# Patient Record
Sex: FEMALE | Race: OTHER
[De-identification: names, ages, dates, MRNs, and addresses within clinical notes are randomized per-mention and may not be internally consistent; named-entity substitution may affect disease eponyms.]

---

## 2018-06-07 LAB
ALBUMIN SERPL BCP-MCNC: 4.2 G/DL (ref 3.4–5)
ALBUMIN/GLOB SERPL: 1.4 {RATIO} (ref 1.1–1.5)
ALP SERPL-CCNC: 128 IU/L (ref 46–116)
ALT SERPL W P-5'-P-CCNC: 23 U/L (ref 12–78)
ANION GAP SERPL CALCULATED.3IONS-SCNC: 10 MMOL/L (ref 8–16)
AST SERPL W P-5'-P-CCNC: 13 U/L (ref 10–37)
BASOPHILS # BLD AUTO: 0 X10'3 (ref 0–0.2)
BASOPHILS NFR BLD AUTO: 0.3 % (ref 0–1)
BILIRUB SERPL-MCNC: 0.5 MG/DL (ref 0.1–1)
BUN SERPL-MCNC: 8 MG/DL (ref 7–18)
BUN/CREAT SERPL: 15.1 (ref 6.6–38)
CALCIUM SERPL-MCNC: 9 MG/DL (ref 8.5–10.1)
CHLORIDE SERPL-SCNC: 109 MMOL/L (ref 99–107)
CO2 SERPL-SCNC: 27 MMOL/L (ref 24–32)
CREAT SERPL-MCNC: 0.53 MG/DL (ref 0.4–0.9)
EOSINOPHIL # BLD AUTO: 0 X10'3 (ref 0–0.9)
EOSINOPHIL NFR BLD AUTO: 0.5 % (ref 0–6)
ERYTHROCYTE [DISTWIDTH] IN BLOOD BY AUTOMATED COUNT: 13 % (ref 11.5–14.5)
GFR SERPL CREATININE-BSD FRML MDRD: > 90 ML/MIN
GLUCOSE SERPL-MCNC: 115 MG/DL (ref 70–104)
HCT VFR BLD AUTO: 39.6 % (ref 35–45)
HGB BLD-MCNC: 13.7 G/DL (ref 12–16)
LYMPHOCYTES # BLD AUTO: 1.7 X10'3 (ref 1.1–4.8)
LYMPHOCYTES NFR BLD AUTO: 24.7 % (ref 21–51)
MCH RBC QN AUTO: 29.8 PG (ref 27–31)
MCHC RBC AUTO-ENTMCNC: 34.7 % (ref 33–36.5)
MCV RBC AUTO: 85.8 FL (ref 78–98)
MONOCYTES # BLD AUTO: 0.4 X10'3 (ref 0–0.9)
MONOCYTES NFR BLD AUTO: 5.3 % (ref 2–12)
NEUTROPHILS # BLD AUTO: 4.7 X10'3 (ref 1.8–7.7)
NEUTROPHILS NFR BLD AUTO: 69.2 % (ref 42–75)
PLATELET # BLD AUTO: 184 X10'3 (ref 140–440)
PMV BLD AUTO: 9.3 FL (ref 7.4–10.4)
POTASSIUM SERPL-SCNC: 3.8 MMOL/L (ref 3.5–5.1)
PROT SERPL-MCNC: 7.3 G/DL (ref 6.4–8.2)
RBC # BLD AUTO: 4.61 X10'6 (ref 4.2–5.6)
SODIUM SERPL-SCNC: 146 MMOL/L (ref 135–145)

## 2018-06-14 ENCOUNTER — HOSPITAL ENCOUNTER (OUTPATIENT)
Dept: HOSPITAL 94 - PAS | Age: 53
Discharge: HOME | End: 2018-06-14
Attending: ORTHOPAEDIC SURGERY
Payer: MEDICAID

## 2018-06-14 VITALS — DIASTOLIC BLOOD PRESSURE: 88 MMHG | SYSTOLIC BLOOD PRESSURE: 148 MMHG

## 2018-06-14 VITALS — BODY MASS INDEX: 20.28 KG/M2 | HEIGHT: 65 IN | WEIGHT: 121.7 LBS

## 2018-06-14 VITALS — SYSTOLIC BLOOD PRESSURE: 158 MMHG | DIASTOLIC BLOOD PRESSURE: 77 MMHG

## 2018-06-14 VITALS — DIASTOLIC BLOOD PRESSURE: 78 MMHG | SYSTOLIC BLOOD PRESSURE: 153 MMHG

## 2018-06-14 VITALS — DIASTOLIC BLOOD PRESSURE: 77 MMHG | SYSTOLIC BLOOD PRESSURE: 146 MMHG

## 2018-06-14 VITALS — SYSTOLIC BLOOD PRESSURE: 148 MMHG | DIASTOLIC BLOOD PRESSURE: 88 MMHG

## 2018-06-14 VITALS — SYSTOLIC BLOOD PRESSURE: 151 MMHG | DIASTOLIC BLOOD PRESSURE: 80 MMHG

## 2018-06-14 VITALS — SYSTOLIC BLOOD PRESSURE: 150 MMHG | DIASTOLIC BLOOD PRESSURE: 74 MMHG

## 2018-06-14 VITALS — SYSTOLIC BLOOD PRESSURE: 150 MMHG | DIASTOLIC BLOOD PRESSURE: 77 MMHG

## 2018-06-14 VITALS — DIASTOLIC BLOOD PRESSURE: 82 MMHG | SYSTOLIC BLOOD PRESSURE: 149 MMHG

## 2018-06-14 VITALS — SYSTOLIC BLOOD PRESSURE: 124 MMHG | DIASTOLIC BLOOD PRESSURE: 73 MMHG

## 2018-06-14 VITALS — DIASTOLIC BLOOD PRESSURE: 78 MMHG | SYSTOLIC BLOOD PRESSURE: 131 MMHG

## 2018-06-14 DIAGNOSIS — M25.861: ICD-10-CM
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
## 2018-06-14 DIAGNOSIS — M25.862: ICD-10-CM
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
## 2018-06-14 DIAGNOSIS — M22.41: ICD-10-CM

## 2018-06-14 DIAGNOSIS — M19.011: ICD-10-CM

## 2018-06-14 DIAGNOSIS — Z79.899: ICD-10-CM

## 2018-06-14 DIAGNOSIS — S83.281A: ICD-10-CM

## 2018-06-14 DIAGNOSIS — S83.282A: ICD-10-CM

## 2018-06-14 DIAGNOSIS — K21.9: ICD-10-CM
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
## 2018-06-14 DIAGNOSIS — I10: ICD-10-CM

## 2018-06-14 DIAGNOSIS — S83.231A: ICD-10-CM

## 2018-06-14 DIAGNOSIS — S83.232A: Primary | ICD-10-CM

## 2018-06-14 DIAGNOSIS — M22.42: ICD-10-CM

## 2018-06-14 DIAGNOSIS — Z79.891: ICD-10-CM

## 2018-06-14 PROCEDURE — 36415 COLL VENOUS BLD VENIPUNCTURE: CPT

## 2018-06-14 PROCEDURE — 29880 ARTHRS KNE SRG MNISECTMY M&L: CPT

## 2018-06-14 PROCEDURE — 29873 ARTHRS KNEE SURG W/LAT RLS: CPT

## 2018-06-14 PROCEDURE — 93005 ELECTROCARDIOGRAM TRACING: CPT

## 2018-06-14 PROCEDURE — 85025 COMPLETE CBC W/AUTO DIFF WBC: CPT

## 2018-06-14 PROCEDURE — 29879 ARTHRS KNE SRG ABRASJ ARTHRP: CPT

## 2018-06-14 PROCEDURE — 80053 COMPREHEN METABOLIC PANEL: CPT

## 2020-07-28 ENCOUNTER — HOSPITAL ENCOUNTER (EMERGENCY)
Dept: HOSPITAL 94 - ER | Age: 55
LOS: 1 days | Discharge: HOME | End: 2020-07-29
Payer: COMMERCIAL

## 2020-07-28 VITALS — HEIGHT: 62 IN | WEIGHT: 123.26 LBS | BODY MASS INDEX: 22.68 KG/M2

## 2020-07-28 DIAGNOSIS — J18.9: Primary | ICD-10-CM

## 2020-07-28 DIAGNOSIS — R50.9: ICD-10-CM

## 2020-07-28 DIAGNOSIS — R51: ICD-10-CM

## 2020-07-28 DIAGNOSIS — I10: ICD-10-CM

## 2020-07-28 DIAGNOSIS — Z79.899: ICD-10-CM

## 2020-07-28 DIAGNOSIS — Z87.440: ICD-10-CM

## 2020-07-28 DIAGNOSIS — R07.89: ICD-10-CM

## 2020-07-28 DIAGNOSIS — M19.90: ICD-10-CM

## 2020-07-28 PROCEDURE — 71045 X-RAY EXAM CHEST 1 VIEW: CPT

## 2020-07-28 PROCEDURE — 99283 EMERGENCY DEPT VISIT LOW MDM: CPT

## 2020-07-29 VITALS — SYSTOLIC BLOOD PRESSURE: 106 MMHG | DIASTOLIC BLOOD PRESSURE: 73 MMHG

## 2020-07-29 NOTE — NUR
PT REPORTS SHE HAS BEEN TAKING : LEVOQUIN AND PREDNISONE OVER THE PAST 6 DAYS 
(PERSCRIBED BY HER BROTHER WHO IS AN MD IN [place]. SHE REPORTS SHE AND HER 
 HAVE BEEN GETTING SOME RELIEF FROM THEIR SYMPTOMS BUT ARE STILL FEELING 
BAD. TEMP 100.0, OTHERWISE VSS. DR. PEREZ AT [facility]. VERBAL RECEIVED FOR 
TYLENOL AND CXR. PT REMAINS IN THE AMBULANCE BAY D/T SUSPECTED COVID.